# Patient Record
Sex: FEMALE | Race: OTHER | NOT HISPANIC OR LATINO | URBAN - METROPOLITAN AREA
[De-identification: names, ages, dates, MRNs, and addresses within clinical notes are randomized per-mention and may not be internally consistent; named-entity substitution may affect disease eponyms.]

---

## 2023-12-19 ENCOUNTER — EMERGENCY (EMERGENCY)
Facility: HOSPITAL | Age: 48
LOS: 0 days | Discharge: HOME | End: 2023-12-19
Attending: STUDENT IN AN ORGANIZED HEALTH CARE EDUCATION/TRAINING PROGRAM
Payer: SELF-PAY

## 2023-12-19 VITALS
HEART RATE: 84 BPM | OXYGEN SATURATION: 99 % | SYSTOLIC BLOOD PRESSURE: 119 MMHG | DIASTOLIC BLOOD PRESSURE: 66 MMHG | HEIGHT: 65 IN | WEIGHT: 130.07 LBS | RESPIRATION RATE: 18 BRPM | TEMPERATURE: 98 F

## 2023-12-19 DIAGNOSIS — M25.532 PAIN IN LEFT WRIST: ICD-10-CM
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
## 2023-12-19 DIAGNOSIS — S69.92XA UNSPECIFIED INJURY OF LEFT WRIST, HAND AND FINGER(S), INITIAL ENCOUNTER: ICD-10-CM
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
## 2023-12-19 PROCEDURE — 99284 EMERGENCY DEPT VISIT MOD MDM: CPT | Mod: 25

## 2023-12-19 PROCEDURE — 99283 EMERGENCY DEPT VISIT LOW MDM: CPT | Mod: 25

## 2023-12-19 PROCEDURE — 29125 APPL SHORT ARM SPLINT STATIC: CPT | Mod: LT

## 2023-12-19 PROCEDURE — 73110 X-RAY EXAM OF WRIST: CPT | Mod: 26,LT

## 2023-12-19 PROCEDURE — 73110 X-RAY EXAM OF WRIST: CPT | Mod: LT

## 2023-12-19 NOTE — ED PROVIDER NOTE - CLINICAL SUMMARY MEDICAL DECISION MAKING FREE TEXT BOX
I have personally seen and examined this patient. I have fully participated in the care of this patient. I have made amendments to the documentation where appropriate and otherwise agree with the history, physical exam, and plan as documented by the patient PA    Left-sided hand injury after fall no LOC x-rays reviewed showed no obvious fracture will discharge with splint well-perfused x 4 good range of motion decreased range of motion with pain

## 2023-12-19 NOTE — ED PROVIDER NOTE - NS ED ATTENDING STATEMENT MOD
This was a shared visit with the ENDY. I reviewed and verified the documentation and independently performed the documented:

## 2023-12-19 NOTE — ED ADULT TRIAGE NOTE - CHIEF COMPLAINT QUOTE
Patient c/o left wrist pain. Patient states she tripped and fell on monday onto left wrist. Pain 6/10, denies head trauma

## 2023-12-19 NOTE — ED PROVIDER NOTE - PATIENT PORTAL LINK FT
You can access the FollowMyHealth Patient Portal offered by [facility] by registering at the following website: [email or web address]. By joining [employer]’s FollowMyHealth portal, you will also be able to view your health information using other applications (apps) compatible with our system. You can access the FollowMyHealth Patient Portal offered by [facility] by registering at the following website: [email or web address]. By joining "[employer]’s FollowMyHealth portal, you will also be able to view your health information using other applications (apps) compatible with our system.

## 2023-12-19 NOTE — ED PROVIDER NOTE - OBJECTIVE STATEMENT
40-year-old female no pertinent past medical history presents for evaluation of left wrist pain.  Patient states that she tripped yesterday and fell onto her left wrist since has mild aching pain, aggravated with ROM, no relieving factors.  Denies numbness, weakness, swelling, discoloration.

## 2023-12-19 NOTE — ED PROVIDER NOTE - ED STEMI HIDDEN
CT HEAD WO CONTRAST     ----- Message from Doyle Zazueta MD sent at 12/16/2019  2:41 PM CST -----  No significant abnormality per report hide

## 2023-12-19 NOTE — ED PROVIDER NOTE - PHYSICAL EXAMINATION
CONST: Well appearing in NAD  CARD: S1 S2; No jvd  RESP: Equal BS B/L, No wheezes, rhonchi or rales. No distress  MS: L wrist tenderness. Normal ROM in all extremities. pulses 2 +. no swelling  SKIN: Warm, dry, no acute rashes. Good turgor  NEURO: A&Ox3, No focal deficits. Strength 5/5 with no sensory deficits.

## 2023-12-19 NOTE — ED PROCEDURE NOTE - CPROC ED TIME OUT STATEMENT1
“Patient's name, , procedure and correct site were confirmed during the [place] Timeout.” “Patient's name, , procedure and correct site were confirmed during the [place] Timeout.”

## 2023-12-19 NOTE — ED ADULT NURSE NOTE - NSFALLUNIVINTERV_ED_ALL_ED
Bed/Stretcher in lowest position, wheels locked, appropriate side rails in place/Call bell, personal items and telephone in reach/Instruct patient to call for assistance before getting out of bed/chair/stretcher/Non-slip footwear applied when patient is off stretcher/[place] to call system/Physically safe environment - no spills, clutter or unnecessary equipment/Purposeful proactive rounding/Room/bathroom lighting operational, light cord in reach Bed/Stretcher in lowest position, wheels locked, appropriate side rails in place/Call bell, personal items and telephone in reach/Instruct patient to call for assistance before getting out of bed/chair/stretcher/Non-slip footwear applied when patient is off stretcher/[place] to call system/Physically safe environment - no spills, clutter or unnecessary equipment/Purposeful proactive rounding/Room/bathroom lighting operational, light cord in reach

## 2023-12-19 NOTE — ED PROVIDER NOTE - CARE PROVIDER_API CALL
Skyler Patel  Orthopaedic Surgery  [address] [place] [address]  Phone: [phone]  Fax: [phone]  Follow Up Time:    Skyler Patel  Orthopaedic Surgery  [address] [place] [address]  Phone: [phone]  Fax: [phone]  Follow Up Time:

## 2024-01-23 NOTE — ED PROCEDURE NOTE - PROCEDURE NAME, MLM
Case Management Discharge Planning    Admission Date: 1/22/2024  GMLOS: 5.1  ALOS: 1    6-Clicks ADL Score: 16  6-Clicks Mobility Score: 13  PT and/or OT Eval ordered: NA  Post-acute Referrals Ordered: NA  Post-acute Choice Obtained: NA  Has referral(s) been sent to post-acute provider:  [facility]      Anticipated Discharge Dispo: Discharge Disposition: D/T to SNF with Medicare cert in anticipation of skilled care (03)  Discharge Address: [address] [place] [address]  Discharge Contact Phone Number: [phone]  Per chart review pt resides in [place]    Family support:  Name Relation Home Work Mobile   Rosario Randhawa [phone]   Current Insurance on file: Medicare and Medicaid FFS    DME Needed:  pending hospital course    Action(s) Taken: DC Assessment Complete (See below) and Referral(s) sent,  chart reviewed,Pt discussed during IDT rounds    Escalations Completed: None    Medically Clear: No    Next Steps: f/u with pt and medical team to discuss dc needs and barriers.    Barriers to Discharge: Medical clearance    Is the patient up for discharge tomorrow: No         Splint